# Patient Record
Sex: FEMALE | Race: WHITE | NOT HISPANIC OR LATINO | URBAN - METROPOLITAN AREA
[De-identification: names, ages, dates, MRNs, and addresses within clinical notes are randomized per-mention and may not be internally consistent; named-entity substitution may affect disease eponyms.]

---

## 2018-06-29 PROBLEM — Z00.00 ENCOUNTER FOR PREVENTIVE HEALTH EXAMINATION: Status: ACTIVE | Noted: 2018-06-29

## 2021-01-27 ENCOUNTER — EMERGENCY (EMERGENCY)
Facility: HOSPITAL | Age: 56
LOS: 1 days | Discharge: HOME | End: 2021-01-27
Attending: EMERGENCY MEDICINE | Admitting: EMERGENCY MEDICINE
Payer: COMMERCIAL

## 2021-01-27 VITALS
SYSTOLIC BLOOD PRESSURE: 122 MMHG | DIASTOLIC BLOOD PRESSURE: 66 MMHG | OXYGEN SATURATION: 98 % | WEIGHT: 130.07 LBS | HEIGHT: 64 IN | RESPIRATION RATE: 16 BRPM | HEART RATE: 79 BPM | TEMPERATURE: 98 F

## 2021-01-27 DIAGNOSIS — R07.89 OTHER CHEST PAIN: ICD-10-CM

## 2021-01-27 DIAGNOSIS — Z20.822 CONTACT WITH AND (SUSPECTED) EXPOSURE TO COVID-19: ICD-10-CM

## 2021-01-27 LAB — SARS-COV-2 RNA SPEC QL NAA+PROBE: SIGNIFICANT CHANGE UP

## 2021-01-27 PROCEDURE — U0005: CPT

## 2021-01-27 PROCEDURE — 99283 EMERGENCY DEPT VISIT LOW MDM: CPT

## 2021-01-27 PROCEDURE — 99284 EMERGENCY DEPT VISIT MOD MDM: CPT

## 2021-01-27 PROCEDURE — U0003: CPT

## 2021-01-27 NOTE — ED PROVIDER NOTE - OBJECTIVE STATEMENT
54 yo female with h/o anxiety, on Prozac and Gabapentin, otherwise no significant PMH , in the ER for covid-19 testing. Pt reports her co-worker tested positive yesterday and they worked together. Pt denies any fever, chills, n/v, SOB, cough, CP, abdominal pain, body aches or malaise. Pt reports that she felt some chest tightness and discomfort, pressure-like on her way to the hospital. Denies any prior cardiac h/o.

## 2021-01-27 NOTE — ED PROVIDER NOTE - NSFOLLOWUPINSTRUCTIONS_ED_ALL_ED_FT
COVID-19: SLOW THE CORONAVIRUS SPREAD - [facility] Instructions(ER/ED)           COVID-19: Slow the Coronavirus Spread    WHAT YOU NEED TO KNOW:    The virus that causes coronavirus disease 2019 (COVID-19) is highly contagious (easily spread). COVID-19 can cause severe or life-threatening health problems in some people. Signs and symptoms of infection can take up to 14 days to begin, or may not develop at all. This means you or someone around you may have the virus and pass it to others without knowing it. No vaccine is currently available for the new coronavirus. A vaccine is under investigation for use as active immunization against COVID-19 in adults. Your healthcare provider can give you more information about when a vaccine may be available to you. In the meantime, help slow the spread of the virus by following worldwide and local guidelines for infection prevention.    Limit the Spread of Infectious Disease         DISCHARGE INSTRUCTIONS:    Call your doctor if:   •You have questions about social distancing or ways to keep your home and family safe.      •You have questions or concerns about the new coronavirus or COVID-19.      How the 2019 coronavirus spreads: The virus spreads quickly and easily. You can become infected if you are in contact with a large amount of the virus, even for a short time. You can also become infected by being around a small amount of virus for a long time. The following are ways the virus is thought to spread, but more information may be coming:   •Droplets are the most common way all coronaviruses spread. The virus can travel in droplets that form when a person talks, coughs, or sneezes. Anyone who breathes in the droplets or gets them in his or her eyes can become infected with the virus. Droplets can stay in the air for a few hours and travel farther than 6 feet (2 meters). A person can have contact with the droplets, even after the infected person leaves the room. This is called airborne transmission, a less common way the virus can spread. It has mainly happened in closed rooms that do not have flowing air.      •Close personal contact with an infected person increases the risk for infection. Close personal contact means you are within 6 feet (2 meters) of the person. The virus can be passed starting 2 days before symptoms begin. The virus can be passed even if the person never develops symptoms, starting 2 days before a positive test. Infection can happen from close contact for a total of 15 minutes or more over 24 hours. An example is close contact 3 times for 5 minutes each within 24 hours. Some factors make infection more likely. These include how close you are and for how long. If you are indoors or outdoors also matters. The risk is even higher if both of you are not wearing face coverings.      •Person-to-person contact can spread the virus. For example, a person with the virus on his or her hands can spread it by shaking hands with someone. Some newborns have tested positive for the virus. It is not known for sure if the newborns were infected during pregnancy or after they were born. The greatest risk is for a  to be in close contact with an infected person. The virus also does not appear to spread in breast milk. If you are pregnant or breastfeeding, talk to your healthcare provider or obstetrician about any concerns you have.      •The virus can stay on objects and surfaces. A person can get the virus on his or her hands by touching the object or surface. Infection happens if the person then touches his or her eyes or mouth with unwashed hands. It is not yet known how long the virus can stay on an object or surface. That is why it is important to clean all objects and surfaces that are used regularly.      •An infected animal may be able to infect a person who touches it. This may happen at live markets or on a farm.      How to wash your hands to help prevent the virus from spreading: Use soap and water. Rub your soapy hands together, lacing your fingers. Wash the front and back of each hand, and in between your fingers. Use the fingers of one hand to scrub under the fingernails of the other hand. Wash for at least 20 seconds. Rinse with warm, running water for several seconds. Then dry your hands with a clean towel or paper towel. Do not touch your eyes, nose, or mouth without washing your hands first. Wash your hands often throughout the day. Use hand  that contains alcohol if soap and water are not available. Teach children how to wash their hands and use hand .    Handwashing         How to cover sneezes and coughs to help prevent the virus from spreading: Turn your face away and cover your mouth and nose with a tissue. Throw the tissue away. Use the bend of your arm if a tissue is not available. Then wash your hands well with soap and water or use hand . Turn your head and cover your face when you are around someone who is sneezing or coughing. Teach children how to cover a cough or sneeze. Remind them to wash their hands.    How social distancing will help prevent the virus from spreading: The best way to prevent infection is to avoid anyone who is infected, but this can be hard to do. An infected person can spread the virus before signs or symptoms begin, or even if signs or symptoms never develop. Social distancing means people avoid close personal contact so the virus cannot spread from one person to another. National and local social distancing rules vary. Rules may change over time as restrictions are lifted. Restrictions may return if an outbreak happens where you live. It is important to know and follow all current social distancing rules in your area. The following are general guidelines:   •Stay home if you are sick or think you may have COVID-19. It is important to stay home if you are waiting for a testing appointment or for test results. Even if you do not have symptoms, you can pass the virus to others.      •Do not have close physical contact with anyone who does not live in your home. Do not shake hands with, hug, or kiss a person as a greeting. Stand or walk as far from others as possible, especially around anyone who is sneezing or coughing. If you must use public transportation (such as a bus or subway), try to sit or stand away from others. You can stay safely connected with others through phone calls, e-mail messages, social media websites, and video chats. Check in on anyone who may be having a hard time socially distancing, or who lives alone. Ask administrators at nursing homes or long-term care facilities how you can safely communicate with someone living there.      •Wear a face covering (mask) around anyone who does not live in your home. A covering helps protect the person wearing it from being infected or passing the virus to others. Remember, you or someone near you may be infected and not know it. Use a disposable non-medical mask, or make a cloth covering with at least 2 layers. Cover your mouth and your nose. Securely fasten it under your chin and on the sides of your face.?Do not put a face shield or a cloth covering on a  or infant. These increase the risk for sudden infant death syndrome (SIDS).      ?A face covering is not a substitute for other safety measures. Continue social distancing and washing your hands often.      ?Do not touch your face covering or eyes while you are wearing the covering. Your eyes will not be covered by the cloth. You can be infected if the virus is on your hand and you touch your eyes. Wash your hands with soap and water for 20 seconds or use hand  before you remove your face covering.      ?Do not use a plastic face shield instead of a cloth covering. You can wear a face shield and a face covering for extra protection. If someone needs to read your lips, use a covering with a clear mouth area, if possible. If a face shield must be used, choose one that wraps around both sides of the face and goes below the chin. Shields that can be used more than 1 time need to be cleaned and disinfected between uses.      ?Do not use coverings that have breathing valves or vents. The virus can travel out of the valve or vent and be spread to others.      ?Leave your face covering on while you are around others. Do not take your covering off to talk, cough, or sneeze.      ?Some people should not wear face coverings. Do not use coverings on children younger than 2 years or on anyone who has breathing problems or cannot remove it. Your healthcare provider can tell you what to do to protect someone who cannot wear a face covering.      ?Wash face coverings often. Wash them in the warmest water the fabric allows. Make sure the fabric is completely dry before you use the face covering again. Only wear clean coverings when you go out.      •Only allow medical professionals or other necessary helpers into your home. Wear your face covering, and remind others to wear a face covering. Remind them to wash their hands when they arrive and before they leave. Do not let a visitor into your home, even if the person is not sick. A person can pass the virus to others before symptoms of COVID-19 begin. Some people never even develop symptoms. Children commonly have mild symptoms or no symptoms. It may be hard to tell a child not to hug or kiss you. Explain that this is how he or she can help you stay healthy.      •Do not go to someone else's home unless it is necessary. Do not go over to visit, even if the person is lonely. Only go if you need to help him or her.      •Avoid large gatherings and crowds. Gatherings or crowds of 10 or more individuals can cause the virus to spread. Examples of gatherings include parties, holiday meals, sporting events, Voodoo services, and conferences. Crowds may form at beaches, turk, and tourist attractions. Protect yourself by staying away from large gatherings and crowds.      •Ask your healthcare provider for other ways to have appointments. Some providers offer phone, video, or other types of appointments. You may also be able to get prescriptions for a few months of your medicines at a time.      •Stay safe if you must go out to work. You may have a job that can only be done outside your home. Keep physical distance between you and other workers as much as possible. Follow your employer's rules so everyone stays safe.      Prevent an infection in your home:   •Wash your hands often. Make it a habit to wash your hands throughout the day. Wash before and after you care for anyone who thinks or knows he or she has COVID-19. Use soap and water. Remember to wash for at least 20 seconds. You can use hand  that contains alcohol if soap and water are not available.      •Clean and disinfect your home often. Do this even if you think no one living in or coming to your home is infected with the virus. A person can spread the virus before symptoms begin, or even if no symptoms develop. Clean and disinfect to kill and remove the virus from high-touch surfaces and items. This prevents anyone from getting the virus on his or her hands and becoming infected or spreading the virus to others. The following are general guidelines:?Wear disposable gloves while you are cleaning. Do not touch surfaces or items with your bare hands.      ?Use disinfecting wipes or a disinfecting solution. You can make a solution by diluting 4 teaspoons of bleach in 1 quart (4 cups) of water. Clean with a sponge or cloth that can be thrown away or washed in hot, soapy water and reused.      ?Be careful with . Open windows or have circulating air as you clean. Do not mix ammonia with bleach. This will create toxic fumes. Read the labels of all products you use.      ?Clean and disinfect surfaces throughout your home. Surfaces include countertops, cupboard doors, desks, handrails, doorknobs, toilet handles, faucets, chairs, and light switches.      ?Clean and disinfect items well. Objects include keys, phones, computer keyboards and mice, video games, remote controls, and children's toys.      ?Clean used dishes and utensils well. Use hot, soapy water or a .      ?Wash clothing and bedding well. You can use regular laundry detergent. Follow instructions on the clothing or bedding label. Wash and dry on the warmest settings for the fabric.      •Do not share items with anyone. This includes food, drinks, dishes, and eating utensils.      •Prepare surfaces any time you are going to bring something into your home. Find space you can use to place items you bring in. Find space you can clean and disinfect well, such as a kitchen countertop.      •Safely handle packages delivered to your home. It is not known for sure how long the virus can stay on cardboard or other packaging. You may want to wipe packages with sanitizing wipes. Open the package. Wash your hands. Then move the contents of the package onto a clean surface. Throw the packaging away outside your home. Then wash your hands for at least 20 seconds with soap and water. Clean the surface you laid the package on when you brought it in. Remember to clean and disinfect anything else you touched, such as doorknobs or faucet handles.      •Safely handle food you have delivered or  from a restaurant. If possible, have food left at your door or placed into your car. This helps prevent close physical contact with anyone. Wash your hands before you eat.      •Keep anyone who is infected away from others in the home. A person who has COVID-19 needs to stay at home until healthcare providers say it is okay to be around others. This is important, but it can also lead to others in the home becoming infected. If possible, keep the infected person isolated (away from others in the home). The person should have his or her own dishes and eating utensils. Items the person uses need to be cleaned separately. Anyone who touches the person's items, clothing, or bedding needs to wear gloves that can be thrown away after use. It is important for the person to feel connected with others. Isolation can be lonely. The person may feel anxious or depressed. He or she can safely stay connected through phone calls, video chats, social media, and e-mail messages.      •Anyone who is infected should not handle live animals unless it is necessary. Until more is known, it is best for the person not to touch, play with, or handle live animals until well. Some animals, including pets, have been infected with the new coronavirus. Care includes feeding, petting, and cuddling your pet. A pet should not lick the person or share his or her food. Someone who is not infected should take care of the pet, if possible. If the infected person must care for a pet, he or she needs to wear a face covering. Handwashing is important before and after the person gives care. Talk to a healthcare provider about how to keep a service animal safe, if needed.      Stay safe if you must go out of your home: It is not known for sure how long the virus can live on objects and surfaces. Until more is known, follow these and any local recommendations to prevent infection:   •Limit trips out of your home. You may be able to have food, medicines, and other supplies delivered.      •Before you go out: ?Plan your route. This will help you make the fewest stops possible and help prevent close contact. Keep track of places you go and anyone you have contact with. This will help contact tracers notify others if you become infected.      ?Remember to wash your hands. Wash before you leave your home, so you do not bring the virus with you. Wash again when you get home, after you put away any items you bought.      ?Bring disinfecting wipes or hand  with you. Hold a wipe or tissue as you open any doors. Use hand  after you touch elevator buttons or other commonly used surfaces or items. Apply hand  or use a wipe for your hands before you use the keys to unlock or start the car.      ?Make a list of items to buy before you go out. This will limit the items you touch in the store. The virus may live on surfaces and objects for up to several days. The more items you handle, the more risk you take of getting the virus on your hands.      ?Prepare surfaces for when you return. Find space you can use to place items you bought. Find space you can clean and disinfect well, such as a kitchen countertop.      •While you are out: ?Wear your face covering. Do not touch the covering or your eyes while you are out.      ?Use disinfecting wipes to clean items you need to use to shop. Clean shopping cart handles. Clean the area a toddler will sit on or where you will put a baby carrier. Wipe a cart made for children to drive in the store before your toddler gets into it. Wipe the seat, steering wheel, and any part your child must touch.      ?If possible, use a debit or credit card to pay. The virus may be able to stay on paper money. You can become infected when you touch the money.      •When you get home: ?Carefully take the face covering off and wash your hands. Put used coverings together. If possible, keep them in a closed laundry bag or basket until you can wash them.      ?Unpack your items safely. Wipe or wash your hands before you open packaging. Put your items on the clean surface you prepared. You can use a disinfecting wipe to clean items before you put them away. Wash fruits and vegetables before you put them away. If possible, scrub the skins with a vegetable brush.      ?Clean countertops or other surfaces any shopping bags touched. When you are finished putting your items away, wash your hands. Use disinfecting wipes or a solution to clean surfaces. You can also wipe the vehicle you drove. Wipe the area the bags had been. Wipe anything you touched inside the car. Examples include the steering wheel, seatbelt, inner and outer door handles, turn signals, and radio or other buttons. After items are put away and areas cleaned, remember to wash your hands.        For more information:   •Centers for Disease Control and Prevention  [address], [place]  Phone: 3-439-3813978  Phone: 1-057-5276109  Web Address: http://www.cdc.gov

## 2021-01-27 NOTE — ED PROVIDER NOTE - ATTENDING CONTRIBUTION TO CARE
I discussed the plan of care of the patient directly with the PA while the patient was in the Emergency Department. I did not perform a face to face diagnostic evaluation on this patient. EKG reviewed and wnl. Has son sick w/ COVID-19 at home. Symptoms likely 2/2 COVID-19. VSS, lungs clear.  I have reviewed the ACP note and agree with the history, exam and plan of care. Signed as per Hospital policy.

## 2021-01-27 NOTE — ED PROVIDER NOTE - PATIENT PORTAL LINK FT
You can access the FollowMyHealth Patient Portal offered by [facility] by registering at the following website: [email or web address]. By joining [employer]’s FollowMyHealth portal, you will also be able to view your health information using other applications (apps) compatible with our system.

## 2021-01-27 NOTE — ED ADULT NURSE NOTE - OBJECTIVE STATEMENT
55 y.o F a&ox4 walked in from triage c.o med eval. came in for covid swab due to exposure to co-worker who is + COVID. reports x 3 weeks fo chest tightness. Pt state " I feel like my chest is congested but I don't have a cold." denies loss of taste, fevers, chills, n/v/d, abd pain, SOB. EKG complete.

## 2021-01-27 NOTE — ED PROVIDER NOTE - CLINICAL SUMMARY MEDICAL DECISION MAKING FREE TEXT BOX
well appearing 56 yo female in the ER for covid-19 testing. Pt was exposed to someone covid+. pt well appearing and oin NAD, has VSS, ECG- non ischemic, exam- unremarkable. Pt admits having some anxiety about possible covid and currently on anti-anxiety meds. PCR covid done. pt comfortable for discharge. no acute emergency work -up indicated.

## 2021-01-27 NOTE — ED ADULT NURSE NOTE - CHPI ED NUR SYMPTOMS NEG
no back pain/no diaphoresis/no dizziness/no fever/no nausea/no shortness of breath/no syncope/no vomiting

## 2021-01-31 ENCOUNTER — EMERGENCY (EMERGENCY)
Facility: HOSPITAL | Age: 56
LOS: 1 days | Discharge: HOME | End: 2021-01-31
Admitting: EMERGENCY MEDICINE
Payer: COMMERCIAL

## 2021-01-31 VITALS
HEIGHT: 64 IN | DIASTOLIC BLOOD PRESSURE: 78 MMHG | SYSTOLIC BLOOD PRESSURE: 134 MMHG | HEART RATE: 67 BPM | OXYGEN SATURATION: 99 % | RESPIRATION RATE: 19 BRPM | TEMPERATURE: 99 F

## 2021-01-31 DIAGNOSIS — Z20.822 CONTACT WITH AND (SUSPECTED) EXPOSURE TO COVID-19: ICD-10-CM

## 2021-01-31 LAB — SARS-COV-2 RNA SPEC QL NAA+PROBE: SIGNIFICANT CHANGE UP

## 2021-01-31 PROCEDURE — 99283 EMERGENCY DEPT VISIT LOW MDM: CPT

## 2021-01-31 PROCEDURE — U0005: CPT

## 2021-01-31 PROCEDURE — U0003: CPT

## 2021-01-31 NOTE — ED PROVIDER NOTE - OBJECTIVE STATEMENT
Patient is presenting to the Emergency Department with a request to have COVID-19 testing.  Denies symptoms, including cough, shortness of breath, fever, chills, bodyaches or sore throat. Pt had an exposure at work.

## 2021-01-31 NOTE — ED PROVIDER NOTE - PATIENT PORTAL LINK FT
You can access the FollowMyHealth Patient Portal offered by [facility] by registering at the following website: [email or web address]. By joining [employer]’s FollowMyHealth portal, you will also be able to view your health information using other applications (apps) compatible with our system.

## 2023-01-25 ENCOUNTER — NON-APPOINTMENT (OUTPATIENT)
Age: 58
End: 2023-01-25

## 2023-01-25 ENCOUNTER — APPOINTMENT (OUTPATIENT)
Dept: HEART AND VASCULAR | Facility: CLINIC | Age: 58
End: 2023-01-25
Payer: COMMERCIAL

## 2023-01-25 VITALS
WEIGHT: 132.98 LBS | SYSTOLIC BLOOD PRESSURE: 100 MMHG | TEMPERATURE: 98.7 F | HEIGHT: 64 IN | DIASTOLIC BLOOD PRESSURE: 62 MMHG | BODY MASS INDEX: 22.7 KG/M2 | OXYGEN SATURATION: 99 % | HEART RATE: 79 BPM

## 2023-01-25 DIAGNOSIS — R07.9 CHEST PAIN, UNSPECIFIED: ICD-10-CM

## 2023-01-25 PROCEDURE — 99203 OFFICE O/P NEW LOW 30 MIN: CPT

## 2023-02-13 PROBLEM — R07.9 CHEST PAIN: Status: ACTIVE | Noted: 2023-02-13

## 2023-02-15 ENCOUNTER — APPOINTMENT (OUTPATIENT)
Dept: HEART AND VASCULAR | Facility: CLINIC | Age: 58
End: 2023-02-15
Payer: COMMERCIAL

## 2023-02-15 PROCEDURE — 93306 TTE W/DOPPLER COMPLETE: CPT

## 2023-02-15 NOTE — HISTORY OF PRESENT ILLNESS
[FreeTextEntry1] : 58 F\par - HTN, - DM, - lipids, - tobacco, - family history\par  Active, no limitation, presnets w longstanding intermittent CP, not exertional, no radiation, no associated N/V, SOB or diaphoresis. Not exertional or positional

## 2023-02-15 NOTE — ASSESSMENT
[FreeTextEntry1] : EKG ; NSR normal\par \par A/P\par Chest pain\par - no signif ASCVD risk factors\par - exam and EKG unremarkable\par - - echo

## 2023-09-25 NOTE — ED PROVIDER NOTE - NS ED ATTENDING STATEMENT MOD
Lab faxed to patients work.    I have personally performed a face to face diagnostic evaluation on this patient. I have reviewed the ACP note and agree with the history, exam and plan of care, except as noted.

## 2023-10-26 ENCOUNTER — RX ONLY (RX ONLY)
Age: 58
End: 2023-10-26

## 2023-10-26 ENCOUNTER — OFFICE (OUTPATIENT)
Dept: URBAN - METROPOLITAN AREA CLINIC 28 | Facility: CLINIC | Age: 58
Setting detail: OPHTHALMOLOGY
End: 2023-10-26
Payer: COMMERCIAL

## 2023-10-26 DIAGNOSIS — H25.13: ICD-10-CM

## 2023-10-26 DIAGNOSIS — H16.223: ICD-10-CM

## 2023-10-26 PROBLEM — H52.4 PRESBYOPIA ; BOTH EYES: Status: ACTIVE | Noted: 2023-10-26

## 2023-10-26 PROCEDURE — 92004 COMPRE OPH EXAM NEW PT 1/>: CPT | Performed by: OPHTHALMOLOGY

## 2023-10-26 ASSESSMENT — KERATOMETRY
OD_K1POWER_DIOPTERS: 46.00
METHOD_AUTO_MANUAL: AUTO
OD_AXISANGLE_DEGREES: 040
OD_K2POWER_DIOPTERS: 46.50
OS_K1POWER_DIOPTERS: 46.25
OS_K2POWER_DIOPTERS: 46.25
OS_AXISANGLE_DEGREES: 090

## 2023-10-26 ASSESSMENT — TONOMETRY
OD_IOP_MMHG: 17
OS_IOP_MMHG: 15

## 2023-10-26 ASSESSMENT — REFRACTION_AUTOREFRACTION
OD_SPHERE: -0.25
OS_SPHERE: -0.25
OS_CYLINDER: -0.75
OD_CYLINDER: -0.75
OD_AXIS: 009
OS_AXIS: 105

## 2023-10-26 ASSESSMENT — TEAR BREAK UP TIME (TBUT)
OD_TBUT: 1+
OS_TBUT: 1+

## 2023-10-26 ASSESSMENT — SPHEQUIV_DERIVED
OD_SPHEQUIV: -0.625
OS_SPHEQUIV: -0.625

## 2023-10-26 ASSESSMENT — AXIALLENGTH_DERIVED
OD_AL: 22.8494
OS_AL: 22.8494

## 2023-10-26 ASSESSMENT — CONFRONTATIONAL VISUAL FIELD TEST (CVF)
OS_FINDINGS: FULL
OD_FINDINGS: FULL

## 2023-10-26 ASSESSMENT — VISUAL ACUITY
OD_BCVA: 20/20-1
OS_BCVA: 20/20

## 2024-09-25 ENCOUNTER — APPOINTMENT (OUTPATIENT)
Dept: ORTHOPEDIC SURGERY | Facility: CLINIC | Age: 59
End: 2024-09-25
Payer: COMMERCIAL

## 2024-09-25 VITALS — RESPIRATION RATE: 16 BRPM | HEIGHT: 64 IN | BODY MASS INDEX: 22.2 KG/M2 | WEIGHT: 130 LBS

## 2024-09-25 DIAGNOSIS — Z87.39 PERSONAL HISTORY OF OTHER DISEASES OF THE MUSCULOSKELETAL SYSTEM AND CONNECTIVE TISSUE: ICD-10-CM

## 2024-09-25 DIAGNOSIS — Z82.61 FAMILY HISTORY OF ARTHRITIS: ICD-10-CM

## 2024-09-25 DIAGNOSIS — Z80.9 FAMILY HISTORY OF MALIGNANT NEOPLASM, UNSPECIFIED: ICD-10-CM

## 2024-09-25 DIAGNOSIS — M25.532 PAIN IN LEFT WRIST: ICD-10-CM

## 2024-09-25 DIAGNOSIS — M25.531 PAIN IN RIGHT WRIST: ICD-10-CM

## 2024-09-25 DIAGNOSIS — M19.032 PRIMARY OSTEOARTHRITIS, LEFT WRIST: ICD-10-CM

## 2024-09-25 DIAGNOSIS — M19.031 PRIMARY OSTEOARTHRITIS, RIGHT WRIST: ICD-10-CM

## 2024-09-25 DIAGNOSIS — F10.21 ALCOHOL DEPENDENCE, IN REMISSION: ICD-10-CM

## 2024-09-25 DIAGNOSIS — Z87.891 PERSONAL HISTORY OF NICOTINE DEPENDENCE: ICD-10-CM

## 2024-09-25 PROCEDURE — 73110 X-RAY EXAM OF WRIST: CPT | Mod: 50

## 2024-09-25 PROCEDURE — 99205 OFFICE O/P NEW HI 60 MIN: CPT

## 2024-09-25 RX ORDER — FLUOXETINE HYDROCHLORIDE 40 MG/1
CAPSULE ORAL
Refills: 0 | Status: ACTIVE | COMMUNITY

## 2024-09-25 NOTE — HISTORY OF PRESENT ILLNESS
[Right] : right hand dominant [FreeTextEntry1] : Patient presents with bilateral CMC joint pain left greater than right and bilateral dorsal wrist weakness and discomfort.  Her symptoms of bilateral base of thumb pain started a few years ago. She has not medically treated her symptoms.

## 2024-09-25 NOTE — ASSESSMENT
[FreeTextEntry1] : Patient is minimally symptomatic basal joint arthritis that is already improved with time alone.  Should symptoms come back she could consider a course of therapy for thenar strengthening and/or basal joint injection.

## 2024-09-25 NOTE — PHYSICAL EXAM
[de-identified] : Tender over the basal joint on the left slightly as well as the right side.  There is ligamentous laxity as she has index through small finger hyperextension can nearly touch thumb to the forearm and elbow recurvatum [de-identified] : PA lateral oblique x-rays of both wrist demonstrate right greater than left basal joint osteoarthritis with joint space narrowing and small osteophyte formation

## 2024-09-25 NOTE — CONSULT LETTER
[Dear  ___] : Dear  [unfilled], [Consult Letter:] : I had the pleasure of evaluating your patient, [unfilled]. [Please see my note below.] : Please see my note below. [Consult Closing:] : Thank you very much for allowing me to participate in the care of this patient.  If you have any questions, please do not hesitate to contact me. [Sincerely,] : Sincerely, [FreeTextEntry3] : Rodo Bolanos MD Co-Director [facility]

## 2025-03-04 ENCOUNTER — OFFICE (OUTPATIENT)
Dept: URBAN - METROPOLITAN AREA CLINIC 28 | Facility: CLINIC | Age: 60
Setting detail: OPHTHALMOLOGY
End: 2025-03-04
Payer: COMMERCIAL

## 2025-03-04 DIAGNOSIS — S05.02XA: ICD-10-CM

## 2025-03-04 PROBLEM — H25.12 CATARACT SENILE NUCLEAR SCLEROSIS;  , LEFT EYE: Status: ACTIVE | Noted: 2025-03-04

## 2025-03-04 PROBLEM — H16.222 DRY EYE SYNDROME K SICCA;  , LEFT EYE: Status: ACTIVE | Noted: 2025-03-04

## 2025-03-04 PROCEDURE — 99214 OFFICE O/P EST MOD 30 MIN: CPT | Performed by: OPHTHALMOLOGY

## 2025-03-04 ASSESSMENT — CONFRONTATIONAL VISUAL FIELD TEST (CVF)
OD_FINDINGS: FULL
OS_FINDINGS: FULL

## 2025-03-04 ASSESSMENT — KERATOMETRY
OS_K2POWER_DIOPTERS: 46.25
OS_AXISANGLE_DEGREES: 090
OD_K2POWER_DIOPTERS: 46.50
METHOD_AUTO_MANUAL: AUTO
OD_K1POWER_DIOPTERS: 46.00
OS_K1POWER_DIOPTERS: 46.25
OD_AXISANGLE_DEGREES: 040

## 2025-03-04 ASSESSMENT — VISUAL ACUITY
OD_BCVA: 20/20-1
OS_BCVA: 20/20

## 2025-03-04 ASSESSMENT — REFRACTION_AUTOREFRACTION
OS_AXIS: 105
OD_AXIS: 009
OD_CYLINDER: -0.75
OS_SPHERE: -0.25
OD_SPHERE: -0.25
OS_CYLINDER: -0.75

## 2025-03-04 ASSESSMENT — TEAR BREAK UP TIME (TBUT)
OD_TBUT: 1+
OS_TBUT: 1+

## 2025-03-05 PROBLEM — S05.02XA INJURY OF CONJUNCTIVA AND CORNEAL ABRASION WITHOUT FOREIGN BODY, LEFT EYE; INITIAL ENCOUNTER: Status: ACTIVE | Noted: 2025-03-04

## 2025-06-12 ENCOUNTER — OFFICE (OUTPATIENT)
Dept: URBAN - METROPOLITAN AREA CLINIC 28 | Facility: CLINIC | Age: 60
Setting detail: OPHTHALMOLOGY
End: 2025-06-12
Payer: COMMERCIAL

## 2025-06-12 DIAGNOSIS — H10.45: ICD-10-CM

## 2025-06-12 PROBLEM — H25.13 CATARACT SENILE NUCLEAR SCLEROSIS;  ,, BOTH EYES: Status: ACTIVE | Noted: 2025-06-12

## 2025-06-12 PROBLEM — H16.223 DRY EYE SYNDROME K SICCA;  ,, BOTH EYES: Status: ACTIVE | Noted: 2025-06-12

## 2025-06-12 PROCEDURE — 92012 INTRM OPH EXAM EST PATIENT: CPT | Performed by: OPHTHALMOLOGY

## 2025-06-12 ASSESSMENT — REFRACTION_MANIFEST
OS_AXIS: 100
OS_VA1: 20/20-1
OS_CYLINDER: -0.75
OD_SPHERE: PLANO
OD_CYLINDER: -0.75
OS_SPHERE: PLANO
OD_VA1: 20/20
OD_AXIS: 095

## 2025-06-12 ASSESSMENT — REFRACTION_AUTOREFRACTION
OS_CYLINDER: -0.75
OD_SPHERE: +0.25
OS_AXIS: 100
OS_SPHERE: 0.00
OD_CYLINDER: -0.75
OD_AXIS: 094

## 2025-06-12 ASSESSMENT — TEAR BREAK UP TIME (TBUT)
OD_TBUT: 1+
OS_TBUT: 1+

## 2025-06-12 ASSESSMENT — VISUAL ACUITY
OS_BCVA: 20/30-1
OD_BCVA: 20/30-1

## 2025-06-12 ASSESSMENT — KERATOMETRY
OS_K2POWER_DIOPTERS: 46.00
OD_K2POWER_DIOPTERS: 46.50
OS_K1POWER_DIOPTERS: 46.00
OD_AXISANGLE_DEGREES: 045
OS_AXISANGLE_DEGREES: 090
METHOD_AUTO_MANUAL: AUTO
OD_K1POWER_DIOPTERS: 46.00

## 2025-06-12 ASSESSMENT — TONOMETRY
OS_IOP_MMHG: 13
OD_IOP_MMHG: 11

## 2025-06-12 ASSESSMENT — CONFRONTATIONAL VISUAL FIELD TEST (CVF)
OD_FINDINGS: FULL
OS_FINDINGS: FULL